# Patient Record
Sex: MALE | Race: WHITE
[De-identification: names, ages, dates, MRNs, and addresses within clinical notes are randomized per-mention and may not be internally consistent; named-entity substitution may affect disease eponyms.]

---

## 2017-10-11 ENCOUNTER — HOSPITAL ENCOUNTER (OUTPATIENT)
Dept: HOSPITAL 74 - FASU-ENDO | Age: 72
Discharge: HOME | End: 2017-10-11
Attending: INTERNAL MEDICINE
Payer: COMMERCIAL

## 2017-10-11 VITALS — SYSTOLIC BLOOD PRESSURE: 104 MMHG | TEMPERATURE: 97.8 F | DIASTOLIC BLOOD PRESSURE: 67 MMHG

## 2017-10-11 VITALS — HEART RATE: 62 BPM

## 2017-10-11 VITALS — BODY MASS INDEX: 26.6 KG/M2

## 2017-10-11 DIAGNOSIS — D12.2: ICD-10-CM

## 2017-10-11 DIAGNOSIS — K64.8: ICD-10-CM

## 2017-10-11 DIAGNOSIS — K57.30: ICD-10-CM

## 2017-10-11 DIAGNOSIS — K64.4: ICD-10-CM

## 2017-10-11 DIAGNOSIS — Z86.010: Primary | ICD-10-CM

## 2017-10-11 PROCEDURE — 0DBK8ZX EXCISION OF ASCENDING COLON, VIA NATURAL OR ARTIFICIAL OPENING ENDOSCOPIC, DIAGNOSTIC: ICD-10-PCS | Performed by: INTERNAL MEDICINE

## 2017-10-12 NOTE — PATH
Surgical Pathology Report



Patient Name:  GIGI ASTUDILLO

Accession #:  [id number]

Med. Rec. #:  [id number]                                                        

   /Age/Gender:  1945 (Age: 71) / M

Account:  [id number]                                                          

             Location: 

Taken:  10/11/2017

Received:  10/11/2017

Reported:  10/12/2017

Physicians:  Gareth Ag M.D.

  



Specimen(s) Received

 RIGHT COLON BX 





Clinical History

Polyp







Final Diagnosis

RIGHT COLON, BIOPSY:

TUBULAR ADENOMA.





***Electronically Signed***

Nadira Luevano M.D.





Gross Description

Received in formalin, labeled "right colon" is a tan, irregular portion of soft

tissue measuring 0.6 cm. in greatest dimension. The specimen is submitted in

toto in one cassette.

/10/11/2017



saudi/10/11/2017

## 2018-03-27 PROBLEM — Z00.00 ENCOUNTER FOR PREVENTIVE HEALTH EXAMINATION: Status: ACTIVE | Noted: 2018-03-27

## 2018-04-12 ENCOUNTER — APPOINTMENT (OUTPATIENT)
Dept: CARDIOLOGY | Facility: CLINIC | Age: 73
End: 2018-04-12

## 2018-04-12 VITALS
HEART RATE: 63 BPM | OXYGEN SATURATION: 98 % | BODY MASS INDEX: 26.66 KG/M2 | WEIGHT: 180 LBS | HEIGHT: 69 IN | DIASTOLIC BLOOD PRESSURE: 64 MMHG | SYSTOLIC BLOOD PRESSURE: 150 MMHG | TEMPERATURE: 98.1 F

## 2018-04-12 DIAGNOSIS — Z81.8 FAMILY HISTORY OF OTHER MENTAL AND BEHAVIORAL DISORDERS: ICD-10-CM

## 2018-04-12 DIAGNOSIS — Z87.891 PERSONAL HISTORY OF NICOTINE DEPENDENCE: ICD-10-CM

## 2018-04-12 DIAGNOSIS — Z86.39 PERSONAL HISTORY OF OTHER ENDOCRINE, NUTRITIONAL AND METABOLIC DISEASE: ICD-10-CM

## 2018-04-12 DIAGNOSIS — Z87.01 PERSONAL HISTORY OF PNEUMONIA (RECURRENT): ICD-10-CM

## 2018-04-12 DIAGNOSIS — Z65.8 OTHER SPECIFIED PROBLEMS RELATED TO PSYCHOSOCIAL CIRCUMSTANCES: ICD-10-CM

## 2018-04-12 DIAGNOSIS — E80.4 GILBERT SYNDROME: ICD-10-CM

## 2018-04-13 PROBLEM — Z81.8 FAMILY HISTORY OF DEMENTIA: Status: ACTIVE | Noted: 2018-04-13

## 2018-04-13 PROBLEM — Z65.8 HISTORY OF PANIC DISORDER: Status: RESOLVED | Noted: 2018-04-13 | Resolved: 2018-04-13

## 2018-04-13 PROBLEM — Z81.8 FAMILY HISTORY OF BIPOLAR DISORDER: Status: ACTIVE | Noted: 2018-04-13

## 2018-04-13 PROBLEM — Z86.39 HISTORY OF HYPERLIPIDEMIA: Status: RESOLVED | Noted: 2018-04-13 | Resolved: 2018-04-13

## 2018-04-13 PROBLEM — E80.4 GILBERT'S DISEASE: Status: RESOLVED | Noted: 2018-04-13 | Resolved: 2018-04-13

## 2018-04-13 PROBLEM — Z87.01 HISTORY OF PNEUMONIA: Status: RESOLVED | Noted: 2018-04-13 | Resolved: 2018-04-13

## 2018-04-15 ENCOUNTER — NON-APPOINTMENT (OUTPATIENT)
Age: 73
End: 2018-04-15

## 2018-04-15 PROBLEM — Z87.891 FORMER SMOKER: Status: ACTIVE | Noted: 2018-04-15

## 2018-05-21 ENCOUNTER — RX RENEWAL (OUTPATIENT)
Age: 73
End: 2018-05-21

## 2018-10-25 ENCOUNTER — NON-APPOINTMENT (OUTPATIENT)
Age: 73
End: 2018-10-25

## 2018-10-25 ENCOUNTER — APPOINTMENT (OUTPATIENT)
Dept: CARDIOLOGY | Facility: CLINIC | Age: 73
End: 2018-10-25

## 2018-10-25 VITALS
OXYGEN SATURATION: 98 % | SYSTOLIC BLOOD PRESSURE: 147 MMHG | DIASTOLIC BLOOD PRESSURE: 84 MMHG | HEART RATE: 63 BPM | BODY MASS INDEX: 26.98 KG/M2 | TEMPERATURE: 97.9 F | WEIGHT: 178 LBS | HEIGHT: 68 IN

## 2018-10-25 DIAGNOSIS — M72.2 PLANTAR FASCIAL FIBROMATOSIS: ICD-10-CM

## 2018-10-28 PROBLEM — M72.2 PLANTAR FASCIITIS: Status: RESOLVED | Noted: 2018-10-28 | Resolved: 2018-10-28

## 2019-04-03 ENCOUNTER — RX RENEWAL (OUTPATIENT)
Age: 74
End: 2019-04-03

## 2019-04-08 ENCOUNTER — APPOINTMENT (OUTPATIENT)
Dept: CARDIOLOGY | Facility: CLINIC | Age: 74
End: 2019-04-08
Payer: MEDICARE

## 2019-04-08 PROCEDURE — 93306 TTE W/DOPPLER COMPLETE: CPT

## 2019-04-15 ENCOUNTER — APPOINTMENT (OUTPATIENT)
Dept: CARDIOLOGY | Facility: CLINIC | Age: 74
End: 2019-04-15
Payer: MEDICARE

## 2019-04-15 ENCOUNTER — NON-APPOINTMENT (OUTPATIENT)
Age: 74
End: 2019-04-15

## 2019-04-15 VITALS
BODY MASS INDEX: 27.28 KG/M2 | OXYGEN SATURATION: 98 % | SYSTOLIC BLOOD PRESSURE: 146 MMHG | HEART RATE: 59 BPM | TEMPERATURE: 98.2 F | DIASTOLIC BLOOD PRESSURE: 83 MMHG | HEIGHT: 68 IN | WEIGHT: 180 LBS

## 2019-04-15 DIAGNOSIS — Z86.79 PERSONAL HISTORY OF OTHER DISEASES OF THE CIRCULATORY SYSTEM: ICD-10-CM

## 2019-04-15 DIAGNOSIS — Z87.898 PERSONAL HISTORY OF OTHER SPECIFIED CONDITIONS: ICD-10-CM

## 2019-04-15 PROCEDURE — 93000 ELECTROCARDIOGRAM COMPLETE: CPT

## 2019-04-15 PROCEDURE — 99214 OFFICE O/P EST MOD 30 MIN: CPT

## 2019-04-15 NOTE — REVIEW OF SYSTEMS
[Eyeglasses] : currently wearing eyeglasses [see HPI] : see HPI [Joint Pain] : joint pain [Anxiety] : anxiety [Negative] : Heme/Lymph

## 2019-04-15 NOTE — DISCUSSION/SUMMARY
[FreeTextEntry1] : Atrial fibrillation\par Redd has known paroxysmal atrial  fibrillation. Sustained atrial fibrillation occurred in 1978 1998, 2004 and 8/04. The 8/04 electrocardiogram revealed atrial fibrillation with a ventricular rate of 165/minute. He has never required electrical cardioversion. All episodes have terminated either spontaneously or with the administration of Pronestyl/diltiazem. A 7/09 Holter monitor was normal. Atrial arrhythmias are anticipated to recur while taking the present medical "rate control therapy." In the setting of a low "[id number]" score and the absence of  atrial fibrillation in the last 14 years, in my judgment the risk of anticoagulation outweighs any potential benefit at this time.\par \par I have recommended the following:\par 1. Continue the present medical regimen\par 2. Anticoagulation/factor X A. inhibitor therapy if/when atrial fibrillation recurs\par \par \par Hypertension\par The working diagnosis is mild essential labile hypertension. Blood pressure levels have reportedly been normal by home monitoring. Mild elevation of the blood pressure on initial examination today (146/83 ) may in part be related to a "white coat effect."  In the setting of atrial fibrillation beta blocker therapy is attractive.\par \par I have  recommended the following:\par 1. Continue present medical regimen\par 2. Home blood pressure monitoring\par 3. Additional antihypertensive medical therapy dependent on the above and the patient's clinical course\par \par \par \par Valvular heart disease\par The 4/19 echocardiogram demonstrated trace mitral and aortic insufficiency. . The pulmonary artery systolic pressure was normal at 19  mmHg. The left ventricular ejection fraction was 65%. The present cardiac physical examination is not suggestive of severe mitral/aortic regurgitation. In view of the lack of symptoms, absence of heart failure and clinical course continued monitoring without intervention is indicated at this time.\par \par I have recommended the following:\par 1.No further cardiac testing for this problem at this time\par \par \par Hyperlipidemia\par Hyperlipidemia represents a risk factor for the development of atherosclerotic heart disease. However there is no evidence of such to date . An  11/04 stress echocardiogram was normal. Stress treadmill ECG studies were normal in 3/09 and 8/15. The resting  4/19  electrocardiogram shows no evidence of myocardial ischemia or infarction. The target LDL level for primary prevention is about 100.The most recent lipid levels are not available for review.  Nonpharmacological therapy, specifically diet and exercise are  emphasized as major aspects of treatment.\par \par I have recommended the following:\par 1 low-salt low-fat low-cholesterol diet. Regular aerobic exercise\par 2 continue the present medical regimen\par 3 target LDL level to about 100 as discussed above.\par 4.Routine laboratory studies including lipid profile through primary care Dr. Martinez\par \par \par

## 2019-04-15 NOTE — PHYSICAL EXAM
[Auscultation Breath Sounds / Voice Sounds] : lungs were clear to auscultation bilaterally [Heart Rate And Rhythm] : heart rate and rhythm were normal [Heart Sounds] : normal S1 and S2 [Murmurs] : no murmurs present [Bowel Sounds] : normal bowel sounds [Abnormal Walk] : normal gait [] : no rash [No Anxiety] : not feeling anxious [FreeTextEntry1] : no edema. feet warm, well perfused

## 2019-04-15 NOTE — HISTORY OF PRESENT ILLNESS
[FreeTextEntry1] : 73-year-old man\par Routine followup\par An episode of epistaxis was treated conservatively with resolution. There's been no recurrence. Redd denies chest pain, palpitations, shortness of breath or syncope. He is physically active exercising regularly with pushups walking briskly and stretching exercises all without restriction or limitation.

## 2019-05-07 ENCOUNTER — RX RENEWAL (OUTPATIENT)
Age: 74
End: 2019-05-07

## 2019-10-29 ENCOUNTER — APPOINTMENT (OUTPATIENT)
Dept: CARDIOLOGY | Facility: CLINIC | Age: 74
End: 2019-10-29
Payer: MEDICARE

## 2019-10-29 VITALS
SYSTOLIC BLOOD PRESSURE: 160 MMHG | BODY MASS INDEX: 27.52 KG/M2 | DIASTOLIC BLOOD PRESSURE: 88 MMHG | WEIGHT: 181 LBS | OXYGEN SATURATION: 98 % | HEART RATE: 62 BPM

## 2019-10-29 PROCEDURE — 93000 ELECTROCARDIOGRAM COMPLETE: CPT

## 2019-10-29 PROCEDURE — 99214 OFFICE O/P EST MOD 30 MIN: CPT

## 2019-10-30 ENCOUNTER — NON-APPOINTMENT (OUTPATIENT)
Age: 74
End: 2019-10-30

## 2019-10-30 NOTE — HISTORY OF PRESENT ILLNESS
[FreeTextEntry1] : 73-year-old man\par Routine followup\par "I feel okay." Redd denies chest pain, palpitations, shortness of breath or syncope.

## 2019-10-30 NOTE — DISCUSSION/SUMMARY
[FreeTextEntry1] : Atrial fibrillation\par Redd has known paroxysmal atrial  fibrillation. Sustained atrial fibrillation occurred in 1978 1998, 2004 and 8/04. The 8/04 electrocardiogram revealed atrial fibrillation with a ventricular rate of 165/minute. He has never required electrical cardioversion. All episodes have terminated either spontaneously or with the administration of Pronestyl/diltiazem. A 7/09 Holter monitor was normal. Atrial arrhythmias are anticipated to recur while taking the present medical "rate control therapy." In the setting of a low "[id number]" score and the absence of  atrial fibrillation in the last 15 years, in my judgment the risk of anticoagulation outweighs any potential benefit at this time.\par \par I have recommended the following:\par 1. Continue the present medical regimen\par 2. Anticoagulation/factor X A. inhibitor therapy if/when atrial fibrillation recurs\par \par \par Hypertension\par The working diagnosis is mild essential labile hypertension. Blood pressure levels have reportedly been normal by home monitoring. Elevation of the blood pressure on initial examination today (160/88 ) may in part be related to a "white coat effect." Followup blood pressure checks will be helpful to determine requirements for additional antihypertensive medical therapy  In the setting of atrial fibrillation beta blocker therapy is attractive.\par \par I have  recommended the following:\par 1. Continue present medical regimen\par 2. Home blood pressure monitoring\par 3. Additional antihypertensive medical therapy dependent on the above and the patient's clinical course\par \par \par \par Valvular heart disease\par The 4/19 echocardiogram demonstrated trace mitral and aortic insufficiency. . The pulmonary artery systolic pressure was normal at 19  mmHg. The left ventricular ejection fraction was 65%. The present cardiac physical examination is not suggestive of severe mitral/aortic regurgitation. In view of the lack of symptoms, absence of heart failure and clinical course continued monitoring without intervention is indicated at this time.\par \par I have recommended the following:\par 1.No further cardiac testing for this problem at this time\par \par \par Hyperlipidemia\par Hyperlipidemia represents a risk factor for the development of atherosclerotic heart disease. However there is no evidence of such to date . An  11/04 stress echocardiogram was normal. Stress treadmill ECG studies were normal in 3/09 and 8/15. The resting  10/19   electrocardiogram shows no evidence of myocardial ischemia or infarction. The target LDL level for primary prevention is about 100.The most recent lipid levels are not available for review.  Nonpharmacological therapy, specifically diet and exercise are  emphasized as major aspects of treatment.\par \par I have recommended the following:\par 1 low-salt low-fat low-cholesterol diet. Regular aerobic exercise\par 2 continue the present medical regimen\par 3 target LDL level to about 100 as discussed above.\par 4.Routine laboratory studies including lipid profile through primary care Dr. Martinez\par \par \par

## 2020-03-17 ENCOUNTER — RX RENEWAL (OUTPATIENT)
Age: 75
End: 2020-03-17

## 2020-04-16 ENCOUNTER — RX RENEWAL (OUTPATIENT)
Age: 75
End: 2020-04-16

## 2020-04-28 ENCOUNTER — APPOINTMENT (OUTPATIENT)
Dept: CARDIOLOGY | Facility: CLINIC | Age: 75
End: 2020-04-28

## 2020-09-01 ENCOUNTER — APPOINTMENT (OUTPATIENT)
Dept: CARDIOLOGY | Facility: CLINIC | Age: 75
End: 2020-09-01
Payer: MEDICARE

## 2020-09-01 VITALS
WEIGHT: 177 LBS | BODY MASS INDEX: 26.91 KG/M2 | HEART RATE: 58 BPM | SYSTOLIC BLOOD PRESSURE: 170 MMHG | DIASTOLIC BLOOD PRESSURE: 90 MMHG | OXYGEN SATURATION: 97 % | TEMPERATURE: 97.2 F

## 2020-09-01 PROCEDURE — 99215 OFFICE O/P EST HI 40 MIN: CPT

## 2020-09-01 PROCEDURE — 93000 ELECTROCARDIOGRAM COMPLETE: CPT

## 2020-09-02 ENCOUNTER — NON-APPOINTMENT (OUTPATIENT)
Age: 75
End: 2020-09-02

## 2020-09-02 NOTE — DISCUSSION/SUMMARY
[FreeTextEntry1] : Atrial fibrillation\par Redd has known paroxysmal atrial  fibrillation. Sustained atrial fibrillation occurred in 1978 1998, 2004 and 8/04. The 8/04 electrocardiogram revealed atrial fibrillation with a ventricular rate of 165/minute. He has never required electrical cardioversion. All episodes have terminated either spontaneously or with the administration of Pronestyl/diltiazem. A 7/09 Holter monitor was normal. Atrial arrhythmias are anticipated to recur while taking the present medical "rate control therapy." In the setting of a low "[id number]" score and the absence of  atrial fibrillation in the last 15 years, in my judgment the risk of anticoagulation outweighs any potential benefit at this time.\par \par I have recommended the following:\par 1. Continue the present medical regimen\par 2. Anticoagulation/factor X A. inhibitor therapy if/when atrial fibrillation recurs\par \par \par \par \par \par Hypertension\par The working diagnosis is mild essential labile hypertension. Blood pressure levels have reportedly been normal by home monitoring. Elevation of the blood pressure on initial examination today (170/90  ) may in part be related to a "white coat effect." Followup blood pressure checks will be helpful to determine requirements for additional antihypertensive medical therapy  In the setting of atrial fibrillation beta blocker therapy is attractive.\par \par I have  recommended the following:\par 1. Continue present medical regimen\par 2. Home blood pressure monitoring\par 3. Additional antihypertensive medical therapy dependent on the above and the patient's clinical course\par \par \par \par Valvular heart disease\par The 4/19 echocardiogram demonstrated trace mitral and aortic insufficiency. . The pulmonary artery systolic pressure was normal at 19  mmHg. The left ventricular ejection fraction was 65%. The present cardiac physical examination is not suggestive of severe mitral/aortic regurgitation. In view of the lack of symptoms, absence of heart failure and clinical course continued monitoring without intervention is indicated at this time.\par \par I have recommended the following:\par 1.No further cardiac testing for this problem at this time\par \par \par Hyperlipidemia\par Hyperlipidemia represents a risk factor for the development of atherosclerotic heart disease. However there is no evidence of such to date . An  11/04 stress echocardiogram was normal. Stress treadmill ECG studies were normal in 3/09 and 8/15. The resting  9/20    electrocardiogram shows no evidence of myocardial ischemia or infarction. The target LDL level for primary prevention is about 100.  The dose of atorvastatin may be increased if required to obtain optimum levels The most recent lipid levels are not available for review.  Nonpharmacological therapy, specifically diet and exercise are  emphasized as major aspects of treatment.\par \par I have recommended the following:\par 1 low-salt low-fat low-cholesterol diet. Regular aerobic exercise\par 2 continue the present medical regimen\par 3 target LDL level to about 100 as discussed above.\par 4.Routine laboratory studies including lipid profile through primary care Dr. Martinez\par 5.Increase atorvastatin dose if required to obtain optimal levels as noted above.\par \par Diagnosis, prognosis, risks, options and alternatives were explained at length the patient. All questions were answered. Issues discussed included coronavirus pandemic, atherosclerotic heart disease, hypertension, hyperlipidemia and paroxysmal atrial fibrillation. An elevated risk regarding medical decision making for this patient encounter was in part related to the issue of anticoagulation. The risk of anticoagulation was weighed against the potential benefit of protection against cardio embolic stroke.\par \par Time was a significant factor for this patient encounter. Total time spent with the patient was 40 minutes. 50% of the time was devoted to counseling and/or coordination of care.\par \par \par

## 2020-09-02 NOTE — REVIEW OF SYSTEMS
[Eyeglasses] : currently wearing eyeglasses [Joint Pain] : joint pain [see HPI] : see HPI [Anxiety] : anxiety [Negative] : Heme/Lymph

## 2020-09-02 NOTE — HISTORY OF PRESENT ILLNESS
[FreeTextEntry1] : 74-year-old man\par Routine followup\par Redd is deathly afraid of archie the corona virus. He has been following all CDC guidelines  He denies symptoms of chest pain palpitations shortness of breath ankle edema or syncope. No fever. No cough.\par Home blood pressure levels have been normal ( see attached report)

## 2020-09-02 NOTE — PHYSICAL EXAM
[Heart Rate And Rhythm] : heart rate and rhythm were normal [Auscultation Breath Sounds / Voice Sounds] : lungs were clear to auscultation bilaterally [Murmurs] : no murmurs present [Heart Sounds] : normal S1 and S2 [Abnormal Walk] : normal gait [Bowel Sounds] : normal bowel sounds [] : no rash [No Anxiety] : not feeling anxious [FreeTextEntry1] : no edema. feet warm, well perfused

## 2021-01-11 ENCOUNTER — APPOINTMENT (OUTPATIENT)
Dept: CARDIOLOGY | Facility: CLINIC | Age: 76
End: 2021-01-11
Payer: MEDICARE

## 2021-01-11 PROCEDURE — 99442: CPT | Mod: CS,95

## 2021-02-27 ENCOUNTER — RX RENEWAL (OUTPATIENT)
Age: 76
End: 2021-02-27

## 2021-03-01 ENCOUNTER — NON-APPOINTMENT (OUTPATIENT)
Age: 76
End: 2021-03-01

## 2021-03-01 ENCOUNTER — APPOINTMENT (OUTPATIENT)
Dept: CARDIOLOGY | Facility: CLINIC | Age: 76
End: 2021-03-01
Payer: MEDICARE

## 2021-03-01 VITALS
TEMPERATURE: 97.5 F | HEART RATE: 68 BPM | DIASTOLIC BLOOD PRESSURE: 84 MMHG | SYSTOLIC BLOOD PRESSURE: 160 MMHG | BODY MASS INDEX: 27.98 KG/M2 | OXYGEN SATURATION: 99 % | WEIGHT: 184 LBS

## 2021-03-01 PROCEDURE — 93000 ELECTROCARDIOGRAM COMPLETE: CPT

## 2021-03-01 PROCEDURE — 99214 OFFICE O/P EST MOD 30 MIN: CPT

## 2021-03-02 NOTE — DISCUSSION/SUMMARY
[FreeTextEntry1] : Atrial fibrillation\par Redd has known paroxysmal atrial  fibrillation. Sustained atrial fibrillation occurred in 1978 1998, 2004 and 8/04. The 8/04 electrocardiogram revealed atrial fibrillation with a ventricular rate of 165/minute. He has never required electrical cardioversion. All episodes have terminated either spontaneously or with the administration of Pronestyl/diltiazem. A 7/09 Holter monitor was normal. Atrial arrhythmias are anticipated to recur while taking the present medical "rate control therapy." In the setting of a low "[id number]" score and the absence of  atrial fibrillation in the last 15 years, in my judgment the risk of anticoagulation outweighs any potential benefit at this time.\par \par I have recommended the following:\par 1. Continue the present medical regimen\par 2. Anticoagulation/factor X A. inhibitor therapy if/when atrial fibrillation recurs\par \par \par \par \par \par Hypertension\par The working diagnosis is mild essential labile hypertension.. Elevation of the blood pressure on initial examination today (160/84 ) may in part be related to a "white coat effect."  Home blood pressure readings have been significantly better (see attached report)    Followup blood pressure checks will be helpful to determine requirements for additional antihypertensive medical therapy  In the setting of atrial fibrillation beta blocker therapy is attractive.\par \par I have  recommended the following:\par 1. Continue present medical regimen\par 2. Home blood pressure monitoring\par 3. Additional antihypertensive medical therapy dependent on the above and the patient's clinical course\par \par \par \par Valvular heart disease\par The 4/19 echocardiogram demonstrated trace mitral and aortic insufficiency. . The pulmonary artery systolic pressure was normal at 19  mmHg. The left ventricular ejection fraction was 65%. The present cardiac physical examination is not suggestive of severe mitral/aortic regurgitation. In view of the lack of symptoms, absence of heart failure and clinical course continued monitoring without intervention is indicated at this time.\par \par I have recommended the following:\par 1.No further cardiac testing for this problem at this time\par \par \par Hyperlipidemia\par Hyperlipidemia represents a risk factor for the development of atherosclerotic heart disease. However there is no evidence of such to date . An  11/04 stress echocardiogram was normal. Stress treadmill ECG studies were normal in 3/09 and 8/15. The resting  3/21     electrocardiogram shows no evidence of myocardial ischemia or infarction. The target LDL level for primary prevention is about 100.  The dose of atorvastatin may be increased if required to obtain optimum levels The most recent lipid levels are not available for review.  Nonpharmacological therapy, specifically diet and exercise are  emphasized as major aspects of treatment.\par \par I have recommended the following:\par 1 low-salt low-fat low-cholesterol diet. Regular aerobic exercise\par 2 continue the present medical regimen\par 3 target LDL level to about 100 as discussed above.\par 4.Routine laboratory studies including lipid profile through primary care Dr. Martinez\par 5.Increase atorvastatin dose if required to obtain optimal levels as noted above.\par \par Diagnosis, prognosis, risks, options and alternatives were explained at length the patient. All questions were answered. Issues discussed included coronavirus pandemic, atherosclerotic heart disease, hypertension, hyperlipidemia and paroxysmal atrial fibrillation. An elevated risk regarding medical decision making for this patient encounter was in part related to the issue of anticoagulation. The risk of anticoagulation was weighed against the potential benefit of protection against cardio embolic stroke.\par \par Time was a significant factor for this patient encounter. Total time spent with the patient was 30  minutes. 50% of the time was devoted to counseling and/or coordination of care.\par \par \par

## 2021-03-02 NOTE — HISTORY OF PRESENT ILLNESS
[FreeTextEntry1] : 75-year-old man\par Routine followup\par "I feel okay."  Redd denies chest pain, palpitations, shortness of breath at rest or syncope. He is physically active walking without restriction or limitation. Home blood pressure levels have been relatively normal. ( see attached report.)\par \par Redd has received the Covid 19 vaccination and is planning to visit his new granddaughter

## 2021-03-29 ENCOUNTER — RX RENEWAL (OUTPATIENT)
Age: 76
End: 2021-03-29

## 2021-09-10 ENCOUNTER — APPOINTMENT (OUTPATIENT)
Dept: CARDIOLOGY | Facility: CLINIC | Age: 76
End: 2021-09-10
Payer: MEDICARE

## 2021-09-10 PROCEDURE — 99441: CPT | Mod: 95

## 2021-09-13 ENCOUNTER — HOSPITAL ENCOUNTER (OUTPATIENT)
Dept: HOSPITAL 74 - FASU-ENDO | Age: 76
Discharge: HOME | End: 2021-09-13
Attending: INTERNAL MEDICINE
Payer: COMMERCIAL

## 2021-09-13 VITALS — HEART RATE: 73 BPM | TEMPERATURE: 97.6 F

## 2021-09-13 VITALS — BODY MASS INDEX: 26.6 KG/M2

## 2021-09-13 VITALS — SYSTOLIC BLOOD PRESSURE: 113 MMHG | DIASTOLIC BLOOD PRESSURE: 70 MMHG

## 2021-09-13 DIAGNOSIS — Z86.010: Primary | ICD-10-CM

## 2021-09-13 DIAGNOSIS — K57.30: ICD-10-CM

## 2021-09-13 DIAGNOSIS — K64.0: ICD-10-CM

## 2021-09-13 PROCEDURE — 0DJD8ZZ INSPECTION OF LOWER INTESTINAL TRACT, VIA NATURAL OR ARTIFICIAL OPENING ENDOSCOPIC: ICD-10-PCS | Performed by: INTERNAL MEDICINE

## 2021-09-23 ENCOUNTER — NON-APPOINTMENT (OUTPATIENT)
Age: 76
End: 2021-09-23

## 2021-09-23 ENCOUNTER — APPOINTMENT (OUTPATIENT)
Dept: CARDIOLOGY | Facility: CLINIC | Age: 76
End: 2021-09-23
Payer: MEDICARE

## 2021-09-23 VITALS
HEART RATE: 62 BPM | DIASTOLIC BLOOD PRESSURE: 77 MMHG | HEIGHT: 68 IN | WEIGHT: 184 LBS | BODY MASS INDEX: 27.89 KG/M2 | SYSTOLIC BLOOD PRESSURE: 155 MMHG | TEMPERATURE: 98.3 F | OXYGEN SATURATION: 97 %

## 2021-09-23 PROCEDURE — 99214 OFFICE O/P EST MOD 30 MIN: CPT

## 2021-09-23 PROCEDURE — 93000 ELECTROCARDIOGRAM COMPLETE: CPT

## 2021-09-24 NOTE — HISTORY OF PRESENT ILLNESS
[FreeTextEntry1] : 75-year-old man\par Routine followup\par "I feel good."  Redd denies chest pain, shortness of breath, palpitations or syncope. He is physically active exercising regularly without restriction or limitation. He is excited about the birth of a new grandchild.

## 2021-09-24 NOTE — DISCUSSION/SUMMARY
[FreeTextEntry1] : Atrial fibrillation\par Redd has known paroxysmal atrial  fibrillation. Sustained atrial fibrillation occurred in 1978 1998, 2004 and 8/04. The 8/04 electrocardiogram revealed atrial fibrillation with a ventricular rate of 165/minute. He has never required electrical cardioversion. All episodes have terminated either spontaneously or with the administration of Pronestyl/diltiazem. A 7/09 Holter monitor was normal. Atrial arrhythmias are anticipated to recur while taking the present medical "rate control therapy." In the setting of a low "[id number]" score and the absence of  atrial fibrillation in the last 15 years, in my judgment the risk of anticoagulation outweighs any potential benefit at this time.\par \par I have recommended the following:\par 1. Continue the present medical regimen\par 2. Anticoagulation/factor X A. inhibitor therapy if/when atrial fibrillation recurs\par \par \par \par \par \par Hypertension\par The working diagnosis is mild essential labile hypertension.. Elevation of the blood pressure on initial examination today (155/77 mmHg  ) may in part be related to a "white coat effect."  Home blood pressure readings have been significantly better (see attached report)    Followup blood pressure checks will be helpful to determine requirements for additional antihypertensive medical therapy  In the setting of atrial fibrillation beta blocker therapy is attractive.\par \par I have  recommended the following:\par 1. Continue present medical regimen\par 2. Home blood pressure monitoring\par 3. Additional antihypertensive medical therapy dependent on the above and the patient's clinical course\par \par \par \par \par \par Valvular heart disease\par The 4/19 echocardiogram demonstrated trace mitral and aortic insufficiency. . The pulmonary artery systolic pressure was normal at 19  mmHg. The left ventricular ejection fraction was 65%. The present cardiac physical examination is not suggestive of severe mitral/aortic regurgitation. In view of the lack of symptoms, absence of heart failure and clinical course continued monitoring without intervention is indicated at this time.\par \par I have recommended the following:\par 1.No further cardiac testing for this problem at this time\par \par \par \par \par \par Hyperlipidemia\par Hyperlipidemia represents a risk factor for the development of atherosclerotic heart disease. However there is no evidence of such to date . An  11/04 stress echocardiogram was normal. Stress treadmill ECG studies were normal in 3/09 and 8/15. The resting  9/21      electrocardiogram shows no evidence of myocardial ischemia or infarction. The target LDL level for primary prevention is about 100.  The dose of atorvastatin may be increased if required to obtain optimum levels The most recent lipid levels are not available for review.  Nonpharmacological therapy, specifically diet and exercise are  emphasized as major aspects of treatment.\par \par I have recommended the following:\par 1 low-salt low-fat low-cholesterol diet. Regular aerobic exercise\par 2 continue the present medical regimen\par 3 target LDL level to about 100 as discussed above.\par 4.Routine laboratory studies including lipid profile through primary care Dr. Martinez\par 5.Increase atorvastatin dose if required to obtain optimal levels as noted above.\par \par \par \par \par \par The  diagnosis, prognosis, risks, options and alternatives were explained at length the patient. All questions were answered. Issues discussed included coronavirus pandemic, atherosclerotic heart disease, hypertension, hyperlipidemia and paroxysmal atrial fibrillation. An elevated risk regarding medical decision making for this patient encounter was in part related to the issue of anticoagulation. The risk of anticoagulation was weighed against the potential benefit of protection against cardio embolic stroke.\par \par Time was a significant factor for this patient encounter. Total time spent with the patient was 30  minutes. 50% of the time was devoted to counseling and/or coordination of care.\par \par \par

## 2022-02-17 ENCOUNTER — RX RENEWAL (OUTPATIENT)
Age: 77
End: 2022-02-17

## 2022-03-28 ENCOUNTER — NON-APPOINTMENT (OUTPATIENT)
Age: 77
End: 2022-03-28

## 2022-03-28 ENCOUNTER — APPOINTMENT (OUTPATIENT)
Dept: CARDIOLOGY | Facility: CLINIC | Age: 77
End: 2022-03-28
Payer: MEDICARE

## 2022-03-28 VITALS
BODY MASS INDEX: 26.52 KG/M2 | HEIGHT: 68 IN | HEART RATE: 68 BPM | WEIGHT: 175 LBS | DIASTOLIC BLOOD PRESSURE: 70 MMHG | OXYGEN SATURATION: 98 % | SYSTOLIC BLOOD PRESSURE: 150 MMHG

## 2022-03-28 DIAGNOSIS — Z86.79 PERSONAL HISTORY OF OTHER DISEASES OF THE CIRCULATORY SYSTEM: ICD-10-CM

## 2022-03-28 PROCEDURE — 99214 OFFICE O/P EST MOD 30 MIN: CPT

## 2022-03-28 PROCEDURE — 93000 ELECTROCARDIOGRAM COMPLETE: CPT

## 2022-04-01 PROBLEM — Z86.79 HISTORY OF ATRIAL FIBRILLATION: Status: RESOLVED | Noted: 2022-04-01 | Resolved: 2022-04-01

## 2022-04-01 RX ORDER — ALPRAZOLAM 0.5 MG/1
0.5 TABLET ORAL
Refills: 0 | Status: ACTIVE | COMMUNITY

## 2022-04-01 NOTE — DISCUSSION/SUMMARY
[FreeTextEntry1] : Atrial fibrillation\par Redd has known paroxysmal atrial  fibrillation. Sustained atrial fibrillation occurred in 1978 1998, 2004 and 8/04. The 8/04 electrocardiogram revealed atrial fibrillation with a ventricular rate of 165/minute. He has never required electrical cardioversion. All episodes have terminated either spontaneously or with the administration of Pronestyl/diltiazem. A 7/09 Holter monitor was normal. Atrial arrhythmias are anticipated to recur while taking the present medical "rate control therapy." In the setting of a low "[id number]" score and the absence of  atrial fibrillation in the last 15 years, in my judgment the risk of anticoagulation outweighs any potential benefit at this time.\par \par I have recommended the following:\par 1. Continue the present medical regimen\par 2. Anticoagulation/factor Xa . inhibitor therapy if/when atrial fibrillation recurs\par \par \par \par \par \par Hypertension\par The working diagnosis is mild essential labile hypertension.. Elevation of the blood pressure on initial examination today (150/70 mmHg  ) may in part be related to a "white coat effect."  Home blood pressure readings have been significantly better (see attached report)    Followup blood pressure checks will be helpful to determine requirements for additional antihypertensive medical therapy  In the setting of atrial fibrillation beta blocker therapy is attractive.\par \par I have  recommended the following:\par 1. Continue present medical regimen\par 2. Home blood pressure monitoring\par 3. Additional antihypertensive medical therapy dependent on the above and the patient's clinical course\par \par \par \par \par \par Valvular heart disease\par The 4/19 echocardiogram demonstrated trace mitral and aortic insufficiency. . The pulmonary artery systolic pressure was normal at 19  mmHg. The left ventricular ejection fraction was 65%. The present cardiac physical examination is not suggestive of severe mitral/aortic regurgitation. In view of the lack of symptoms, absence of heart failure and clinical course continued monitoring without intervention is indicated at this time.\par \par I have recommended the following:\par 1.No further cardiac testing for this problem at this time\par \par \par \par \par \par Hyperlipidemia\par Hyperlipidemia represents a risk factor for the development of atherosclerotic heart disease. However there is no evidence of such to date . An  11/04 stress echocardiogram was normal. Stress treadmill ECG studies were normal in 3/09 and 8/15. The resting   3/22      electrocardiogram shows no evidence of myocardial ischemia or infarction. The target LDL level for primary prevention is about 100.  The dose of atorvastatin may be increased if required to obtain optimum levels The most recent lipid levels are not available for review.  Nonpharmacological therapy, specifically diet and exercise are  emphasized as major aspects of treatment.\par \par I have recommended the following:\par 1 low-salt low-fat low-cholesterol diet. Regular aerobic exercise\par 2 continue the present medical regimen\par 3 target LDL level to about 100 as discussed above.\par 4.Routine laboratory studies including lipid profile through primary care Dr. Martinez\par 5.Increase atorvastatin dose if required to obtain optimal levels as noted above.\par \par \par \par \par \par The  diagnosis, prognosis, risks, options and alternatives were explained at length the patient. All questions were answered. Issues discussed included coronavirus pandemic, atherosclerotic heart disease, hypertension, hyperlipidemia and paroxysmal atrial fibrillation. An elevated risk regarding medical decision making for this patient encounter was in part related to the issue of anticoagulation. The risk of anticoagulation was weighed against the potential benefit of protection against cardio embolic stroke.\par \par Time was a significant factor for this patient encounter. Total time spent with the patient was 30  minutes. 50% of the time was devoted to counseling and/or coordination of care.\par \par \par

## 2022-04-01 NOTE — HISTORY OF PRESENT ILLNESS
[FreeTextEntry1] : 76-year-old man\par Routine followup\par "I feel okay."  Redd denies chest pain, palpitations, shortness of breath or syncope. He is physically active exercising regularly without restriction or limitation.

## 2022-06-06 ENCOUNTER — RX RENEWAL (OUTPATIENT)
Age: 77
End: 2022-06-06

## 2022-10-10 ENCOUNTER — NON-APPOINTMENT (OUTPATIENT)
Age: 77
End: 2022-10-10

## 2022-10-10 ENCOUNTER — APPOINTMENT (OUTPATIENT)
Dept: CARDIOLOGY | Facility: CLINIC | Age: 77
End: 2022-10-10

## 2022-10-10 VITALS
WEIGHT: 175 LBS | SYSTOLIC BLOOD PRESSURE: 140 MMHG | HEART RATE: 67 BPM | DIASTOLIC BLOOD PRESSURE: 70 MMHG | OXYGEN SATURATION: 97 % | BODY MASS INDEX: 26.52 KG/M2 | HEIGHT: 68 IN

## 2022-10-10 PROCEDURE — 99214 OFFICE O/P EST MOD 30 MIN: CPT

## 2022-10-10 PROCEDURE — 93000 ELECTROCARDIOGRAM COMPLETE: CPT

## 2022-10-13 NOTE — DISCUSSION/SUMMARY
[FreeTextEntry1] : Atrial fibrillation\par Redd has known paroxysmal atrial  fibrillation. Sustained atrial fibrillation occurred in 1978 1998, 2004 and 8/04. The 8/04 electrocardiogram revealed atrial fibrillation with a ventricular rate of 165/minute. He has never required electrical cardioversion. All episodes have terminated either spontaneously or with the administration of Pronestyl/diltiazem. A 7/09 Holter monitor was normal. Atrial arrhythmias are anticipated to recur while taking the present medical "rate control therapy." In the setting of a low "[id number]" score and the absence of  atrial fibrillation in the last 15 years, in my judgment the risk of anticoagulation outweighs any potential benefit at this time.\par \par I have recommended the following:\par 1. Continue the present medical regimen\par 2. Anticoagulation/factor Xa . inhibitor therapy if/when atrial fibrillation recurs\par \par \par \par \par \par Hypertension\par The working diagnosis is mild essential labile hypertension.  Blood pressure levels have been in a satisfactory range with the present medical regimen  ( see home blood pressure monitoring report).  In the setting of atrial fibrillation beta blocker therapy is attractive.\par \par I have  recommended the following:\par 1. Continue  the  present medical regimen\par 2. Home blood pressure monitoring\par \par \par \par \par \par \par Valvular heart disease\par The 4/19 echocardiogram demonstrated trace mitral and aortic insufficiency. . The pulmonary artery systolic pressure was normal at 19  mmHg. The left ventricular ejection fraction was 65%. The present cardiac physical examination is not suggestive of severe mitral/aortic regurgitation. In view of the lack of symptoms, absence of heart failure and clinical course continued monitoring without intervention is indicated at this time.\par \par I have recommended the following:\par 1.No further cardiac testing for this problem at this time\par \par \par \par \par \par Hyperlipidemia\par Hyperlipidemia represents a risk factor for the development of atherosclerotic heart disease. However there is no evidence of such to date . An  11/04 stress echocardiogram was normal. Stress treadmill ECG studies were normal in 3/09 and 8/15. The resting   10/22      electrocardiogram shows no evidence of myocardial ischemia or infarction. The target LDL level for primary prevention is about 100.  The dose of atorvastatin may be increased if required to obtain optimum levels The most recent lipid levels are not available for review.  Nonpharmacological therapy, specifically diet and exercise are  emphasized as major aspects of treatment.\par \par I have recommended the following:\par 1 low-salt low-fat low-cholesterol diet. Regular aerobic exercise\par 2 continue the present medical regimen\par 3 target LDL level to about 100 as discussed above.\par 4.Routine laboratory studies including lipid profile through primary care Dr. Martinez\par 5.Increase atorvastatin dose if required to obtain optimal levels as noted above.\par \par \par \par \par \par The  diagnosis, prognosis, risks, options and alternatives were explained at length the patient. All questions were answered. Issues discussed included coronavirus pandemic, atherosclerotic heart disease, hypertension, hyperlipidemia and paroxysmal atrial fibrillation. An elevated risk regarding medical decision making for this patient encounter was in part related to the issue of anticoagulation. The risk of anticoagulation was weighed against the potential benefit of protection against cardio embolic stroke.\par \par Time was a significant factor for this patient encounter. Total time spent with the patient was 30  minutes. 50% of the time was devoted to counseling and/or coordination of care.\par \par \par

## 2022-10-13 NOTE — HISTORY OF PRESENT ILLNESS
[FreeTextEntry1] : 76-year-old man\par Routine followup\par "I feel good." Redd exercises regularly playing basketball shadow boxing walking doing pushups etc. without restriction or limitation. Specifically no chest pain palpitations shortness of breath or syncope.

## 2023-02-05 ENCOUNTER — RX RENEWAL (OUTPATIENT)
Age: 78
End: 2023-02-05

## 2023-04-05 ENCOUNTER — NON-APPOINTMENT (OUTPATIENT)
Age: 78
End: 2023-04-05

## 2023-04-17 ENCOUNTER — APPOINTMENT (OUTPATIENT)
Dept: CARDIOLOGY | Facility: CLINIC | Age: 78
End: 2023-04-17
Payer: MEDICARE

## 2023-04-17 ENCOUNTER — NON-APPOINTMENT (OUTPATIENT)
Age: 78
End: 2023-04-17

## 2023-04-17 VITALS
SYSTOLIC BLOOD PRESSURE: 150 MMHG | DIASTOLIC BLOOD PRESSURE: 82 MMHG | WEIGHT: 175 LBS | HEIGHT: 68 IN | OXYGEN SATURATION: 96 % | HEART RATE: 66 BPM | BODY MASS INDEX: 26.52 KG/M2

## 2023-04-17 DIAGNOSIS — Z86.69 PERSONAL HISTORY OF OTHER DISEASES OF THE NERVOUS SYSTEM AND SENSE ORGANS: ICD-10-CM

## 2023-04-17 PROCEDURE — 99214 OFFICE O/P EST MOD 30 MIN: CPT

## 2023-04-17 PROCEDURE — 93000 ELECTROCARDIOGRAM COMPLETE: CPT

## 2023-04-21 PROBLEM — Z86.69 HISTORY OF MENIERE'S DISEASE: Status: RESOLVED | Noted: 2023-04-21 | Resolved: 2023-04-21

## 2023-04-21 NOTE — HISTORY OF PRESENT ILLNESS
[FreeTextEntry1] : 77-year-old man\par Routine follow-up\par Main complaint is that of "buzzing" in his ears which is positional.  He underwent an ENT evaluation by Dr. Estevez.  Hearing test was reportedly unremarkable.  An MRI study has been ordered for further evaluation.\par \par No chest pain.  No palpitation.  No shortness of breath.  No syncope.

## 2023-04-21 NOTE — PHYSICAL EXAM
[Auscultation Breath Sounds / Voice Sounds] : lungs were clear to auscultation bilaterally [Heart Rate And Rhythm] : heart rate and rhythm were normal [Heart Sounds] : normal S1 and S2 [Murmurs] : no murmurs present [Bowel Sounds] : normal bowel sounds [Abnormal Walk] : normal gait [] : no rash [FreeTextEntry1] : pleasant

## 2023-04-21 NOTE — REVIEW OF SYSTEMS
[Feeling Fatigued] : feeling fatigued [Tinnitus] : tinnitus [Negative] : Heme/Lymph [FreeTextEntry3] : glasses [FreeTextEntry5] : See history of present illness

## 2023-04-21 NOTE — DISCUSSION/SUMMARY
[FreeTextEntry1] : Atrial fibrillation\par Redd has known paroxysmal atrial  fibrillation. Sustained atrial fibrillation occurred in 1978 1998, 2004 and 8/04. The 8/04 electrocardiogram revealed atrial fibrillation with a ventricular rate of 165/minute. He has never required electrical cardioversion. All episodes have terminated either spontaneously or with the administration of Pronestyl/diltiazem. A 7/09 Holter monitor was normal. Atrial arrhythmias are anticipated to recur while taking the present medical "rate control therapy." In the setting of a low "[id number]" score and the absence of  atrial fibrillation in the last 15 years, in my judgment the risk of anticoagulation outweighs any potential benefit at this time.\par \par I have recommended the following:\par 1. Continue the present medical regimen\par 2. Anticoagulation/factor Xa . inhibitor therapy if/when atrial fibrillation recurs\par \par \par \par \par \par Hypertension\par The working diagnosis is mild essential labile hypertension.  Blood pressure levels have been in a satisfactory range with the present medical regimen  ( see home blood pressure monitoring report).  Elevation of the blood pressure on initial examination today (150/82 mmHg) is attributed to a white coat effect.  In the setting of atrial fibrillation beta blocker therapy is attractive.\par \par I have  recommended the following:\par 1. Continue  the  present medical regimen\par 2. Home blood pressure monitoring\par \par \par \par \par \par \par Valvular heart disease\par The 4/19 echocardiogram demonstrated trace mitral and aortic insufficiency. . The pulmonary artery systolic pressure was normal at 19  mmHg. The left ventricular ejection fraction was 65%. The present cardiac physical examination is not suggestive of severe mitral/aortic regurgitation. In view of the lack of symptoms, absence of heart failure and clinical course continued monitoring without intervention is indicated at this time.\par \par I have recommended the following:\par 1.No further cardiac testing for this problem at this time\par \par \par \par \par \par Hyperlipidemia\par Hyperlipidemia represents a risk factor for the development of atherosclerotic heart disease. However there is no evidence of such to date . An  11/04 stress echocardiogram was normal. Stress treadmill ECG studies were normal in 3/09 and 8/15. The resting   4/23      electrocardiogram shows no evidence of myocardial ischemia or infarction. The target LDL level for primary prevention is about 100.  The dose of atorvastatin may be increased if required to obtain optimum levels The most recent lipid levels are not available for review.  Nonpharmacological therapy, specifically diet and exercise are  emphasized as major aspects of treatment.\par \par I have recommended the following:\par 1 low-salt low-fat low-cholesterol diet. Regular aerobic exercise\par 2 continue the present medical regimen\par 3 target LDL level to about 100 as discussed above.\par 4.Routine laboratory studies including lipid profile through primary care Dr. Martinez\par 5.Increase atorvastatin dose if required to obtain optimal levels as noted above.\par \par \par \par \par \par The  diagnosis, prognosis, risks, options and alternatives were explained at length the patient. All questions were answered. Issues discussed included coronavirus pandemic, atherosclerotic heart disease, hypertension, hyperlipidemia and paroxysmal atrial fibrillation. An elevated risk regarding medical decision making for this patient encounter was in part related to the issue of anticoagulation. The risk of anticoagulation was weighed against the potential benefit of protection against cardio embolic stroke.\par \par Time was a significant factor for this patient encounter. Total time spent with the patient was 30  minutes. 50% of the time was devoted to counseling and/or coordination of care.\par \par \par  Yes

## 2023-05-21 RX ORDER — ATENOLOL 50 MG/1
50 TABLET ORAL
Qty: 90 | Refills: 3 | Status: ACTIVE | COMMUNITY
Start: 2023-05-21 | End: 1900-01-01

## 2023-06-01 ENCOUNTER — RX RENEWAL (OUTPATIENT)
Age: 78
End: 2023-06-01

## 2023-10-16 ENCOUNTER — NON-APPOINTMENT (OUTPATIENT)
Age: 78
End: 2023-10-16

## 2023-10-16 ENCOUNTER — APPOINTMENT (OUTPATIENT)
Dept: CARDIOLOGY | Facility: CLINIC | Age: 78
End: 2023-10-16
Payer: MEDICARE

## 2023-10-16 VITALS
HEART RATE: 68 BPM | DIASTOLIC BLOOD PRESSURE: 64 MMHG | HEIGHT: 68 IN | WEIGHT: 175 LBS | BODY MASS INDEX: 26.52 KG/M2 | OXYGEN SATURATION: 98 % | SYSTOLIC BLOOD PRESSURE: 160 MMHG

## 2023-10-16 PROCEDURE — 99213 OFFICE O/P EST LOW 20 MIN: CPT

## 2024-01-30 ENCOUNTER — RX RENEWAL (OUTPATIENT)
Age: 79
End: 2024-01-30

## 2024-01-30 RX ORDER — ATORVASTATIN CALCIUM 10 MG/1
10 TABLET, FILM COATED ORAL
Qty: 90 | Refills: 3 | Status: ACTIVE | COMMUNITY
Start: 2018-01-10 | End: 1900-01-01

## 2024-05-20 ENCOUNTER — APPOINTMENT (OUTPATIENT)
Dept: CARDIOLOGY | Facility: CLINIC | Age: 79
End: 2024-05-20
Payer: MEDICARE

## 2024-05-20 VITALS
OXYGEN SATURATION: 98 % | SYSTOLIC BLOOD PRESSURE: 134 MMHG | WEIGHT: 168 LBS | HEART RATE: 65 BPM | DIASTOLIC BLOOD PRESSURE: 67 MMHG | BODY MASS INDEX: 25.54 KG/M2

## 2024-05-20 DIAGNOSIS — I48.0 PAROXYSMAL ATRIAL FIBRILLATION: ICD-10-CM

## 2024-05-20 DIAGNOSIS — E78.5 HYPERLIPIDEMIA, UNSPECIFIED: ICD-10-CM

## 2024-05-20 DIAGNOSIS — I10 ESSENTIAL (PRIMARY) HYPERTENSION: ICD-10-CM

## 2024-05-20 DIAGNOSIS — I38 ENDOCARDITIS, VALVE UNSPECIFIED: ICD-10-CM

## 2024-05-20 PROCEDURE — 99214 OFFICE O/P EST MOD 30 MIN: CPT

## 2024-05-20 PROCEDURE — 93000 ELECTROCARDIOGRAM COMPLETE: CPT

## 2024-05-20 RX ORDER — AMLODIPINE BESYLATE 2.5 MG/1
2.5 TABLET ORAL
Qty: 30 | Refills: 3 | Status: ACTIVE | COMMUNITY
Start: 2024-05-20 | End: 1900-01-01

## 2024-05-27 ENCOUNTER — NON-APPOINTMENT (OUTPATIENT)
Age: 79
End: 2024-05-27

## 2024-05-27 PROBLEM — E78.5 HYPERLIPIDEMIA: Status: ACTIVE | Noted: 2018-04-12

## 2024-05-27 PROBLEM — I38 VALVULAR HEART DISEASE: Status: ACTIVE | Noted: 2018-10-28

## 2024-05-27 PROBLEM — I48.0 PAROXYSMAL ATRIAL FIBRILLATION: Status: ACTIVE | Noted: 2018-04-13

## 2024-05-27 PROBLEM — I10 HYPERTENSION: Status: ACTIVE | Noted: 2018-10-28

## 2024-05-27 NOTE — DISCUSSION/SUMMARY
[FreeTextEntry1] : Atrial fibrillation Redd has known paroxysmal atrial  fibrillation. Sustained atrial fibrillation occurred in 1978,  and . The  electrocardiogram revealed atrial fibrillation with a ventricular rate of 165/minute. He has never required electrical cardioversion. All episodes have terminated either spontaneously or with the administration of Pronestyl/diltiazem. A  Holter monitor was normal. Atrial arrhythmias are anticipated to recur while taking the present medical "rate control therapy." In the setting of a low "[id number]" score and the absence of  atrial fibrillation in the last  19  years, in my judgment the risk of anticoagulation outweighs any potential benefit at this time.  I have recommended the followin. Continue the present medical regimen 2. Anticoagulation/factor Xa . inhibitor therapy if/when atrial fibrillation recurs      Hypertension The working diagnosis is mild essential labile hypertension.  Blood pressure levels have been  mildly elevated with the present medical regimen  ( see home blood pressure monitoring report).   In the setting of atrial fibrillation beta blocker therapy is attractive.Most patients with significant hypertension require multiple antihypertensive agents working by different mechanism of action to obtain optimal control.  The most recent guidelines from [employer] Association have lowered the threshold for initiation or intensification of medical intervention for the treatment of hypertension.  I have  recommended the followin. Amlodipine 2.5 mg/day added to the present medical regimen with further dose adjustment dictated by tolerance and response 2. Home blood pressure monitoring       Valvular heart disease The  echocardiogram demonstrated trace mitral and aortic insufficiency. . The pulmonary artery systolic pressure was normal at 19  mmHg. The left ventricular ejection fraction was 65%. The present cardiac physical examination is not suggestive of severe mitral/aortic regurgitation. In view of the lack of symptoms, absence of heart failure and clinical course continued monitoring without intervention is indicated at this time.  I have recommended the followin.No further cardiac testing for this problem at this time 2. Anticipate echocardiogram       Hyperlipidemia Hyperlipidemia represents a risk factor for the development of atherosclerotic heart disease. However there is no evidence of such to date . An   stress echocardiogram was normal. Stress treadmill ECG studies were normal in 3/09 and 8/15. The resting         electrocardiogram shows no evidence of myocardial ischemia or infarction. The target LDL level for primary prevention is about 100. [employer] a reductase inhibitor therapy has been effective.  In 10/23 the serum cholesterol level was 153 triglycerides 57 HDL 59 and LDL 83 The dose of atorvastatin may be increased if required to obtain optimum levels   Nonpharmacological therapy, specifically diet and exercise are  emphasized as major aspects of treatment.  I have recommended the followin low-salt low-fat low-cholesterol diet. Regular aerobic exercise 2 continue the present medical regimen 3 target LDL level to about 100 as discussed above. 4.Routine laboratory studies including lipid profile through primary care Dr. Martinez 5.Increase atorvastatin dose if required to obtain optimal levels as noted above.      The  diagnosis, prognosis, risks, options and alternatives were explained at length the patient. All questions were answered. Issues discussed included coronavirus pandemic, atherosclerotic heart disease, hypertension, hyperlipidemia and paroxysmal atrial fibrillation. An elevated risk regarding medical decision making for this patient encounter was in part related to the issue of anticoagulation. The risk of anticoagulation was weighed against the potential benefit of protection against cardio embolic stroke.  Time was a significant factor for this patient encounter. Total time spent with the patient was 30  minutes. 50% of the time was devoted to counseling and/or coordination of care.

## 2024-05-27 NOTE — HISTORY OF PRESENT ILLNESS
[FreeTextEntry1] : 78-year-old man Routine follow-up for hypertension atrial fibrillation  "I feel okay."  Redd denies symptoms of chest pain palpitations shortness of breath ankle edema or syncope.  He is physically active exercising regularly without restriction or limitation. Home blood pressure levels have been mildly elevated (see home blood pressure monitor report)

## 2024-06-01 ENCOUNTER — RX RENEWAL (OUTPATIENT)
Age: 79
End: 2024-06-01

## 2024-06-05 ENCOUNTER — NON-APPOINTMENT (OUTPATIENT)
Age: 79
End: 2024-06-05

## 2024-06-06 RX ORDER — AMLODIPINE BESYLATE 2.5 MG/1
2.5 TABLET ORAL DAILY
Qty: 90 | Refills: 3 | Status: ACTIVE | COMMUNITY
Start: 2024-06-06 | End: 1900-01-01

## 2024-06-11 ENCOUNTER — RX RENEWAL (OUTPATIENT)
Age: 79
End: 2024-06-11

## 2024-06-11 RX ORDER — ATENOLOL 50 MG/1
50 TABLET ORAL
Qty: 90 | Refills: 3 | Status: ACTIVE | COMMUNITY
Start: 2018-02-07 | End: 1900-01-01

## 2024-11-18 ENCOUNTER — NON-APPOINTMENT (OUTPATIENT)
Age: 79
End: 2024-11-18

## 2024-11-20 ENCOUNTER — NON-APPOINTMENT (OUTPATIENT)
Age: 79
End: 2024-11-20

## 2024-11-20 ENCOUNTER — APPOINTMENT (OUTPATIENT)
Dept: CARDIOLOGY | Facility: CLINIC | Age: 79
End: 2024-11-20
Payer: MEDICARE

## 2024-11-20 VITALS
DIASTOLIC BLOOD PRESSURE: 58 MMHG | HEART RATE: 65 BPM | BODY MASS INDEX: 25.09 KG/M2 | WEIGHT: 165 LBS | SYSTOLIC BLOOD PRESSURE: 153 MMHG | OXYGEN SATURATION: 99 %

## 2024-11-20 DIAGNOSIS — I48.0 PAROXYSMAL ATRIAL FIBRILLATION: ICD-10-CM

## 2024-11-20 DIAGNOSIS — E78.5 HYPERLIPIDEMIA, UNSPECIFIED: ICD-10-CM

## 2024-11-20 DIAGNOSIS — I38 ENDOCARDITIS, VALVE UNSPECIFIED: ICD-10-CM

## 2024-11-20 DIAGNOSIS — I10 ESSENTIAL (PRIMARY) HYPERTENSION: ICD-10-CM

## 2024-11-20 PROCEDURE — 99214 OFFICE O/P EST MOD 30 MIN: CPT

## 2024-11-20 PROCEDURE — 93000 ELECTROCARDIOGRAM COMPLETE: CPT

## 2024-12-13 ENCOUNTER — APPOINTMENT (OUTPATIENT)
Dept: FAMILY MEDICINE | Facility: CLINIC | Age: 79
End: 2024-12-13
Payer: MEDICARE

## 2024-12-13 VITALS
OXYGEN SATURATION: 100 % | SYSTOLIC BLOOD PRESSURE: 160 MMHG | BODY MASS INDEX: 25.01 KG/M2 | HEART RATE: 65 BPM | TEMPERATURE: 96.9 F | HEIGHT: 68 IN | WEIGHT: 165 LBS | DIASTOLIC BLOOD PRESSURE: 60 MMHG

## 2024-12-13 DIAGNOSIS — R53.83 OTHER FATIGUE: ICD-10-CM

## 2024-12-13 DIAGNOSIS — E78.5 HYPERLIPIDEMIA, UNSPECIFIED: ICD-10-CM

## 2024-12-13 DIAGNOSIS — S30.0XXS CONTUSION OF LOWER BACK AND PELVIS, SEQUELA: ICD-10-CM

## 2024-12-13 DIAGNOSIS — R73.09 OTHER ABNORMAL GLUCOSE: ICD-10-CM

## 2024-12-13 DIAGNOSIS — I10 ESSENTIAL (PRIMARY) HYPERTENSION: ICD-10-CM

## 2024-12-13 DIAGNOSIS — Z71.1 PERSON WITH FEARED HEALTH COMPLAINT IN WHOM NO DIAGNOSIS IS MADE: ICD-10-CM

## 2024-12-13 PROCEDURE — G0444 DEPRESSION SCREEN ANNUAL: CPT | Mod: 59

## 2024-12-13 PROCEDURE — G2211 COMPLEX E/M VISIT ADD ON: CPT

## 2024-12-13 PROCEDURE — 99204 OFFICE O/P NEW MOD 45 MIN: CPT

## 2024-12-16 LAB
ALBUMIN SERPL ELPH-MCNC: 4.2 G/DL
ALP BLD-CCNC: 68 U/L
ALT SERPL-CCNC: 30 U/L
ANION GAP SERPL CALC-SCNC: 11 MMOL/L
AST SERPL-CCNC: 36 U/L
BASOPHILS # BLD AUTO: 0.07 K/UL
BASOPHILS NFR BLD AUTO: 0.9 %
BILIRUB SERPL-MCNC: 1.1 MG/DL
BUN SERPL-MCNC: 26 MG/DL
CALCIUM SERPL-MCNC: 9.7 MG/DL
CHLORIDE SERPL-SCNC: 103 MMOL/L
CHOLEST SERPL-MCNC: 153 MG/DL
CO2 SERPL-SCNC: 24 MMOL/L
CREAT SERPL-MCNC: 1.03 MG/DL
EGFR: 73 ML/MIN/1.73M2
EOSINOPHIL # BLD AUTO: 0.23 K/UL
EOSINOPHIL NFR BLD AUTO: 2.9 %
ESTIMATED AVERAGE GLUCOSE: 120 MG/DL
GGT SERPL-CCNC: 16 U/L
GLUCOSE SERPL-MCNC: 94 MG/DL
HBA1C MFR BLD HPLC: 5.8 %
HCT VFR BLD CALC: 46.1 %
HDLC SERPL-MCNC: 54 MG/DL
HGB BLD-MCNC: 15.1 G/DL
IMM GRANULOCYTES NFR BLD AUTO: 0.3 %
LDLC SERPL CALC-MCNC: 84 MG/DL
LYMPHOCYTES # BLD AUTO: 1.32 K/UL
LYMPHOCYTES NFR BLD AUTO: 16.6 %
MAN DIFF?: NORMAL
MCHC RBC-ENTMCNC: 29.8 PG
MCHC RBC-ENTMCNC: 32.8 G/DL
MCV RBC AUTO: 90.9 FL
MONOCYTES # BLD AUTO: 0.78 K/UL
MONOCYTES NFR BLD AUTO: 9.8 %
NEUTROPHILS # BLD AUTO: 5.54 K/UL
NEUTROPHILS NFR BLD AUTO: 69.5 %
NONHDLC SERPL-MCNC: 99 MG/DL
PLATELET # BLD AUTO: 283 K/UL
POTASSIUM SERPL-SCNC: 4.9 MMOL/L
PROT SERPL-MCNC: 6.8 G/DL
RBC # BLD: 5.07 M/UL
RBC # FLD: 12.7 %
SODIUM SERPL-SCNC: 138 MMOL/L
TRIGL SERPL-MCNC: 81 MG/DL
TSH SERPL-ACNC: 1.62 UIU/ML
WBC # FLD AUTO: 7.96 K/UL

## 2025-01-17 ENCOUNTER — RX RENEWAL (OUTPATIENT)
Age: 80
End: 2025-01-17

## 2025-05-27 ENCOUNTER — NON-APPOINTMENT (OUTPATIENT)
Age: 80
End: 2025-05-27

## 2025-05-27 ENCOUNTER — APPOINTMENT (OUTPATIENT)
Dept: CARDIOLOGY | Facility: CLINIC | Age: 80
End: 2025-05-27
Payer: MEDICARE

## 2025-05-27 VITALS
SYSTOLIC BLOOD PRESSURE: 137 MMHG | BODY MASS INDEX: 25.01 KG/M2 | HEIGHT: 68 IN | DIASTOLIC BLOOD PRESSURE: 73 MMHG | OXYGEN SATURATION: 96 % | HEART RATE: 62 BPM | WEIGHT: 165 LBS

## 2025-05-27 VITALS — HEIGHT: 68 IN

## 2025-05-27 VITALS — SYSTOLIC BLOOD PRESSURE: 141 MMHG | DIASTOLIC BLOOD PRESSURE: 61 MMHG

## 2025-05-27 DIAGNOSIS — I48.0 PAROXYSMAL ATRIAL FIBRILLATION: ICD-10-CM

## 2025-05-27 DIAGNOSIS — I38 ENDOCARDITIS, VALVE UNSPECIFIED: ICD-10-CM

## 2025-05-27 DIAGNOSIS — I10 ESSENTIAL (PRIMARY) HYPERTENSION: ICD-10-CM

## 2025-05-27 DIAGNOSIS — E78.5 HYPERLIPIDEMIA, UNSPECIFIED: ICD-10-CM

## 2025-05-27 PROCEDURE — 93000 ELECTROCARDIOGRAM COMPLETE: CPT

## 2025-05-27 PROCEDURE — 99215 OFFICE O/P EST HI 40 MIN: CPT

## 2025-05-27 RX ORDER — ESCITALOPRAM OXALATE 10 MG/1
10 TABLET, FILM COATED ORAL
Refills: 0 | Status: ACTIVE | COMMUNITY

## 2025-05-28 ENCOUNTER — NON-APPOINTMENT (OUTPATIENT)
Age: 80
End: 2025-05-28